# Patient Record
(demographics unavailable — no encounter records)

---

## 2024-10-16 NOTE — ASSESSMENT
[FreeTextEntry1] : FUAD is a jackie 76-year-old patient who presented today in follow up for a history of probably benign left breast masses.   She has been doing well with no new breast related complaints.   Most recent imaging: Left Breast Targeted Sono - 10/08/2024: -At 2:00 N 12-13 there is a hypoechoic mass measuring 0.5 x 0.5 x 0.4 cm, not significantly changed and probably benign. Short-term follow-up is recommended. -At 6:00-7:00 N9 there is a hypoechoic mass measuring 0.5 x 0.6 x 0.3 cm, not significantly changed and probably benign. Short-term follow-up is recommended. Recommendation: Follow-up bilateral diagnostic mammogram and ultrasound in 6 months. BI-RADS category 3: Probably Benign, as detailed above.   Physical exam was unrevealing today. B/L scattered fibroglandular densities. well healed surgical scar.   We discussed BIRADS 3 lesions. These lesions have a 2% chance of harboring malignancy. There is always an option to obtain a tissue sample with a biopsy to confirm the diagnosis. The procedure was described in detail including the placement of a tissue marker clip. The risks of the procedure, including but not limited to bleeding and infection were also explained. She is not interested in biopsy at this time and agrees to continue with short-term interval imaging, which is traditionally performed at six-month intervals for approximately two years to establish stability.  Imaging with a B/L Dx Mammo & Sono will be due in early April 2025 for short term follow-up, and that will be scheduled today.  She will return for follow-up and clinical breast exam following imaging.    I spent a total of 20 minutes of face-to-face time with this patient, greater than 50% of which was spent in counseling and/or coordination of care. All of her questions were appropriately answered. She knows to call with any concerns.

## 2024-10-16 NOTE — DATA REVIEWED
[FreeTextEntry1] : Left Breast Targeted Sono - 10/08/2024: At 2:00 N 12-13 there is a hypoechoic mass measuring 0.5 x 0.5 x 0.4 cm, not significantly changed and probably benign. Short-term follow-up is recommended.  At 6:00-7:00 N9 there is a hypoechoic mass measuring 0.5 x 0.6 x 0.3 cm, not significantly changed and probably benign. Short-term follow-up is recommended.  IMPRESSION:  Stable probably benign left breast masses.   Recommendation: Follow-up bilateral diagnostic mammogram and ultrasound in 6 months.  BI-RADS category 3: Probably Benign

## 2024-10-16 NOTE — HISTORY OF PRESENT ILLNESS
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   FHx breast cancer - paternal aunt 50.  Her Nay Model risk assessment is: 2.2 % in five years  8.2 % in her lifetime.   FUAD SHELL is a 76-year-old female patient who presents today in follow up for h/o probably benign left breast masses. Since her last visit, she has no new breast related complaints.   Most recent imaging: Left Breast Targeted Sono - 10/08/2024: -At 2:00 N 12-13 there is a hypoechoic mass measuring 0.5 x 0.5 x 0.4 cm, not significantly changed and probably benign. Short-term follow-up is recommended. -At 6:00-7:00 N9 there is a hypoechoic mass measuring 0.5 x 0.6 x 0.3 cm, not significantly changed and probably benign. Short-term follow-up is recommended. Recommendation: Follow-up bilateral diagnostic mammogram and ultrasound in 6 months. BI-RADS category 3: Probably Benign   She presents today for evaluation and imaging review.

## 2024-10-16 NOTE — CONSULT LETTER
[Dear  ___] : Dear  [unfilled], [Courtesy Letter:] : I had the pleasure of seeing your patient, [unfilled], in my office today. [Please see my note below.] : Please see my note below. [Consult Closing:] : Thank you very much for allowing me to participate in the care of this patient.  If you have any questions, please do not hesitate to contact me. [Sincerely,] : Sincerely, [FreeTextEntry3] : [facility]

## 2024-10-16 NOTE — PAST MEDICAL HISTORY
[Postmenopausal] : The patient is postmenopausal [Menarche Age ____] : age at menarche was [unfilled] [Menopause Age____] : age at menopause was [unfilled] [Total Preg ___] : G[unfilled] [Live Births ___] : P[unfilled]  [Age At Live Birth ___] : Age at live birth: [unfilled] [History of Hormone Replacement Treatment] : has no history of hormone replacement treatment [FreeTextEntry5] : none [FreeTextEntry6] : none [FreeTextEntry7] : none [FreeTextEntry8] :  each child for 6 months.

## 2024-10-16 NOTE — PHYSICAL EXAM
[Normocephalic] : normocephalic [Atraumatic] : atraumatic [No Supraclavicular Adenopathy] : no supraclavicular adenopathy [No dominant masses] : no dominant masses in right breast  [No dominant masses] : no dominant masses left breast [No Nipple Discharge] : no left nipple discharge [No Rashes] : no rashes [No Ulceration] : no ulceration [Breast Nipple Inversion] : nipples not inverted [Breast Nipple Retraction] : nipples not retracted [de-identified] : B/L scattered fibroglandular densities. well healed surgical scar.  [de-identified] : No axillary lymphadenopathy appreciated. [de-identified] : No axillary lymphadenopathy appreciated.

## 2025-05-20 NOTE — PHYSICAL EXAM
[Normocephalic] : normocephalic [EOMI] : extra ocular movement intact [No Supraclavicular Adenopathy] : no supraclavicular adenopathy [No Cervical Adenopathy] : no cervical adenopathy [Examined in the supine and seated position] : examined in the supine and seated position [No dominant masses] : no dominant masses in right breast  [No dominant masses] : no dominant masses left breast [No Nipple Retraction] : no left nipple retraction [No Nipple Discharge] : no left nipple discharge [No Axillary Lymphadenopathy] : no left axillary lymphadenopathy [No Rashes] : no rashes [No Ulceration] : no ulceration [de-identified] : NL respirations

## 2025-05-20 NOTE — ASSESSMENT
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 FHx breast cancer - paternal aunt 50.  Her Nay Model risk assessment is: 2.2 % in five years 8.2 % in her lifetime.  Most recent imaging: B/L Dx Mammo & Sono - 05/06/2025: -The breasts are heterogeneously dense, which may obscure small masses. -No suspicious mass, microcalcifications or areas of architectural distortion seen in either breast. -Stable bilateral breast masses noted. Ultrasound: -At 6-7 o'clock N 8-9 of the left breast there is a stable hypoechoic mass measuring 0.7 x 0.3 x 0.4 cm. -At 2:00 N 12-13 of the left breast there is a stable oval circumscribed mass measuring 0.5 x 0.5 x 0.5 cm. -The above masses have been stable since at least 2023 suggesting benign etiology. -There is no other solid or cystic mass noted in either breast. -No right or left axillary lymphadenopathy. BI-RADS Category 2: Benign  We discussed her most recent imaging in detail. We discussed it was benign.  We discussed breast density. Increasing breast density has been found to increase ones risk of breast cancer, but at this time, there is no clear indication for additional imaging in this setting, as both US and MRI have not been found to improve survival. One can consider bilateral screening US. However, out of 1000 women screened, the use of routine US will only identify an additional 3-5 cancers. The use of US was found to increase the likelihood of undergoing more imaging and more biopsies. She does have dense breasts. We have decided to proceed with screening bilateral breast US in the future with her screening mammograms.  All questions and concerns were answered in detail.  Patient is for bilateral mmg/us in 5/2026. She is for follow up after imaging, pending any interval changes.  Total time spent on encounter was greater than 30 minutes , which included face to face time with the patient, performing an exam, reviewing previous medical records, reviewing current imaging/ pathology, documenting in patient record and coordinating care/imaging. Greater than 50% of the encounter was spent on counseling and coordination of her breast issue.

## 2025-05-20 NOTE — PHYSICAL EXAM
[Normocephalic] : normocephalic [EOMI] : extra ocular movement intact [No Supraclavicular Adenopathy] : no supraclavicular adenopathy [No Cervical Adenopathy] : no cervical adenopathy [Examined in the supine and seated position] : examined in the supine and seated position [No dominant masses] : no dominant masses in right breast  [No dominant masses] : no dominant masses left breast [No Nipple Retraction] : no left nipple retraction [No Nipple Discharge] : no left nipple discharge [No Axillary Lymphadenopathy] : no left axillary lymphadenopathy [No Rashes] : no rashes [No Ulceration] : no ulceration [de-identified] : NL respirations

## 2025-05-20 NOTE — DATA REVIEWED
[FreeTextEntry1] : B/L Dx Mammo & Sono - 05/06/2025: Breast composition: The breasts are heterogeneously dense, which may obscure small masses.  Findings:  Mammogram:  No suspicious mass, microcalcifications or areas of architectural distortion seen in either breast. Stable bilateral breast masses noted.  Ultrasound:  Bilateral whole breast ultrasound was performed. At 6-7 o'clock N 8-9 of the left breast there is a stable hypoechoic mass measuring 0.7 x 0.3 x 0.4 cm. At 2:00 N 12-13 of the left breast there is a stable oval circumscribed mass measuring 0.5 x 0.5 x 0.5 cm. The above masses have been stable since at least 2023 suggesting benign etiology. There is no other solid or cystic mass noted in either breast. No right or left axillary lymphadenopathy.  Impression: No mammographic or sonographic evidence of malignancy.  Recommendation: Unless otherwise indicated by clinical findings, annual screening mammography recommended.  BI-RADS Category 2: Benign

## 2025-05-20 NOTE — HISTORY OF PRESENT ILLNESS
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   FHx breast cancer - paternal aunt 50.  Her Nay Model risk assessment is: 2.2 % in five years  8.2 % in her lifetime.  Most recent imaging: B/L Dx Mammo & Sono - 05/06/2025: -The breasts are heterogeneously dense, which may obscure small masses. -No suspicious mass, microcalcifications or areas of architectural distortion seen in either breast.  -Stable bilateral breast masses noted. Ultrasound: -At 6-7 o'clock N 8-9 of the left breast there is a stable hypoechoic mass measuring 0.7 x 0.3 x 0.4 cm. -At 2:00 N 12-13 of the left breast there is a stable oval circumscribed mass measuring 0.5 x 0.5 x 0.5 cm. -The above masses have been stable since at least 2023 suggesting benign etiology. -There is no other solid or cystic mass noted in either breast.  -No right or left axillary lymphadenopathy. BI-RADS Category 2: Benign  Denies any current complaints. No acute changes to her breasts.